# Patient Record
Sex: FEMALE | Race: WHITE | Employment: UNEMPLOYED | URBAN - METROPOLITAN AREA
[De-identification: names, ages, dates, MRNs, and addresses within clinical notes are randomized per-mention and may not be internally consistent; named-entity substitution may affect disease eponyms.]

---

## 2024-01-09 ENCOUNTER — OFFICE VISIT (OUTPATIENT)
Age: 44
End: 2024-01-09
Payer: MEDICAID

## 2024-01-09 VITALS
TEMPERATURE: 97.3 F | WEIGHT: 174.4 LBS | RESPIRATION RATE: 18 BRPM | HEART RATE: 82 BPM | BODY MASS INDEX: 29.06 KG/M2 | DIASTOLIC BLOOD PRESSURE: 101 MMHG | HEIGHT: 65 IN | SYSTOLIC BLOOD PRESSURE: 158 MMHG | OXYGEN SATURATION: 97 %

## 2024-01-09 DIAGNOSIS — Z12.31 ENCOUNTER FOR SCREENING MAMMOGRAM FOR MALIGNANT NEOPLASM OF BREAST: ICD-10-CM

## 2024-01-09 DIAGNOSIS — Z00.00 WELL ADULT EXAM: ICD-10-CM

## 2024-01-09 DIAGNOSIS — Z85.44 HX OF MALIGNANT NEOPLASM OF VULVA: ICD-10-CM

## 2024-01-09 DIAGNOSIS — Z72.0 TOBACCO USE: ICD-10-CM

## 2024-01-09 DIAGNOSIS — Z76.89 ENCOUNTER TO ESTABLISH CARE: Primary | ICD-10-CM

## 2024-01-09 DIAGNOSIS — F41.9 ANXIETY: ICD-10-CM

## 2024-01-09 DIAGNOSIS — Z59.86 FINANCIAL INSECURITY: ICD-10-CM

## 2024-01-09 DIAGNOSIS — I10 PRIMARY HYPERTENSION: ICD-10-CM

## 2024-01-09 PROCEDURE — 99204 OFFICE O/P NEW MOD 45 MIN: CPT | Performed by: STUDENT IN AN ORGANIZED HEALTH CARE EDUCATION/TRAINING PROGRAM

## 2024-01-09 PROCEDURE — 3077F SYST BP >= 140 MM HG: CPT | Performed by: STUDENT IN AN ORGANIZED HEALTH CARE EDUCATION/TRAINING PROGRAM

## 2024-01-09 PROCEDURE — 99386 PREV VISIT NEW AGE 40-64: CPT | Performed by: STUDENT IN AN ORGANIZED HEALTH CARE EDUCATION/TRAINING PROGRAM

## 2024-01-09 PROCEDURE — 3080F DIAST BP >= 90 MM HG: CPT | Performed by: STUDENT IN AN ORGANIZED HEALTH CARE EDUCATION/TRAINING PROGRAM

## 2024-01-09 RX ORDER — FLUOXETINE HYDROCHLORIDE 20 MG/1
40 CAPSULE ORAL DAILY
Qty: 60 CAPSULE | Refills: 2 | Status: SHIPPED | OUTPATIENT
Start: 2024-01-09

## 2024-01-09 RX ORDER — AMLODIPINE BESYLATE 5 MG/1
5 TABLET ORAL DAILY
Qty: 90 TABLET | Refills: 1 | Status: SHIPPED | OUTPATIENT
Start: 2024-01-09

## 2024-01-09 SDOH — ECONOMIC STABILITY: FOOD INSECURITY: WITHIN THE PAST 12 MONTHS, THE FOOD YOU BOUGHT JUST DIDN'T LAST AND YOU DIDN'T HAVE MONEY TO GET MORE.: SOMETIMES TRUE

## 2024-01-09 SDOH — ECONOMIC STABILITY: HOUSING INSECURITY
IN THE LAST 12 MONTHS, WAS THERE A TIME WHEN YOU DID NOT HAVE A STEADY PLACE TO SLEEP OR SLEPT IN A SHELTER (INCLUDING NOW)?: NO

## 2024-01-09 SDOH — ECONOMIC STABILITY: INCOME INSECURITY: HOW HARD IS IT FOR YOU TO PAY FOR THE VERY BASICS LIKE FOOD, HOUSING, MEDICAL CARE, AND HEATING?: SOMEWHAT HARD

## 2024-01-09 SDOH — ECONOMIC STABILITY - INCOME SECURITY: FINANCIAL INSECURITY: Z59.86

## 2024-01-09 SDOH — ECONOMIC STABILITY: FOOD INSECURITY: WITHIN THE PAST 12 MONTHS, YOU WORRIED THAT YOUR FOOD WOULD RUN OUT BEFORE YOU GOT MONEY TO BUY MORE.: SOMETIMES TRUE

## 2024-01-09 ASSESSMENT — PATIENT HEALTH QUESTIONNAIRE - PHQ9
SUM OF ALL RESPONSES TO PHQ QUESTIONS 1-9: 0
SUM OF ALL RESPONSES TO PHQ9 QUESTIONS 1 & 2: 0
SUM OF ALL RESPONSES TO PHQ QUESTIONS 1-9: 0
2. FEELING DOWN, DEPRESSED OR HOPELESS: 0
1. LITTLE INTEREST OR PLEASURE IN DOING THINGS: 0
SUM OF ALL RESPONSES TO PHQ QUESTIONS 1-9: 0
SUM OF ALL RESPONSES TO PHQ QUESTIONS 1-9: 0

## 2024-01-09 NOTE — PROGRESS NOTES
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  Hospitalized since your last visit?\"    NO    “Have you seen or consulted any other health care providers outside of [facility] since your last visit?”    NO        “Have you had a pap smear?”    NO           Vitals:    24 1506   BP: (!) 158/101   Pulse:    Resp:    Temp:    SpO2:         Health Maintenance Due   Topic Date Due    Hepatitis B vaccine (1 of 3 - 3-dose series) Never done    COVID-19 Vaccine (1) Never done    Varicella vaccine (1 of 2 - 2-dose childhood series) Never done    Depression Screen  Never done    HIV screen  Never done    Hepatitis C screen  Never done    DTaP/Tdap/Td vaccine (1 - Tdap) Never done    Cervical cancer screen  Never done    Lipids  Never done    Flu vaccine (1) Never done        The patient, Leeann Jimenez, identity was verified by name and .

## 2024-01-09 NOTE — PROGRESS NOTES
[facility]  [address] 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 female presenting to the clinic today to establish care.        Gyn  About 10 years ago, had a cancerous mass removal of the labia.   Last pap smear was about 2 years, and no previous mammogram  BP  Had tension headache last night. Hasn't had a BP check in a long time..  No history of being on antihypertensives in the past.  Has a strong family history of hypertension      Diet: Breakfast (skip or cereal), Lunch (fast food), Dinner (Navy beans and salted ham), Snacks (granola bars, cheese, fruits), Drinks (Monster, sprite, water and tea)  Sleep: 10pm-7pm, not tired in the morning.  Has been fatigued lately due to having viral gastroenteritis last week.  No longer having the GI symptoms but still feels wiped out  Pap? 2 years ago, hx of vulvar cancer  LMP: first 2 days has heavy periods and then they improve. Sexually active? Yes with 1 male partner, Contraception? Tubal ligation, Vaginal Complaints? None  Mammogram? none  Colonoscopy? N/a  Depression Screen:  PHQ-9 Total Score: 0 (1/9/2024  2:53 PM)              Review of systems:   A comprehensive review of systems was negative except as written in the HPI.    History    No Known Allergies    Past Medical History:   Diagnosis Date    Vulvar cancer (HCC)        Past Surgical History:   Procedure Laterality Date    VULVA SURGERY      mass removal        Family History   Problem Relation Age of Onset    Melanoma Mother     Hypertension Mother     Cirrhosis Father     Diabetes Maternal Grandmother     Hypersomnolence Maternal Grandmother         Social History     Socioeconomic

## 2024-01-09 NOTE — PATIENT INSTRUCTIONS
For the blood pressure: Take half a tablet of the amlodipine for 1 week, and then increase to 1 tablet daily after that    For the anxiety: For the first 2 weeks, take 1 capsule of prozac (fluoxetine in the morning, after that increase 2 capsules daily)

## 2024-01-16 ENCOUNTER — HOSPITAL ENCOUNTER (OUTPATIENT)
Facility: HOSPITAL | Age: 44
Discharge: HOME | End: 2024-01-19
Attending: STUDENT IN AN ORGANIZED HEALTH CARE EDUCATION/TRAINING PROGRAM
Payer: MEDICAID

## 2024-01-16 VITALS — WEIGHT: 174 LBS | BODY MASS INDEX: 28.99 KG/M2 | HEIGHT: 65 IN

## 2024-01-16 DIAGNOSIS — Z12.31 ENCOUNTER FOR SCREENING MAMMOGRAM FOR MALIGNANT NEOPLASM OF BREAST: ICD-10-CM

## 2024-01-16 PROCEDURE — 77067 SCR MAMMO BI INCL CAD: CPT

## 2024-02-20 ENCOUNTER — OFFICE VISIT (OUTPATIENT)
Age: 44
End: 2024-02-20
Payer: MEDICAID

## 2024-02-20 VITALS
DIASTOLIC BLOOD PRESSURE: 82 MMHG | BODY MASS INDEX: 29.42 KG/M2 | HEIGHT: 65 IN | SYSTOLIC BLOOD PRESSURE: 135 MMHG | HEART RATE: 71 BPM | WEIGHT: 176.6 LBS | RESPIRATION RATE: 18 BRPM | OXYGEN SATURATION: 99 % | TEMPERATURE: 97.5 F

## 2024-02-20 DIAGNOSIS — I10 PRIMARY HYPERTENSION: ICD-10-CM

## 2024-02-20 DIAGNOSIS — F41.9 ANXIETY: ICD-10-CM

## 2024-02-20 DIAGNOSIS — Z12.4 ENCOUNTER FOR PAPANICOLAOU SMEAR OF CERVIX: Primary | ICD-10-CM

## 2024-02-20 PROCEDURE — 99214 OFFICE O/P EST MOD 30 MIN: CPT | Performed by: STUDENT IN AN ORGANIZED HEALTH CARE EDUCATION/TRAINING PROGRAM

## 2024-02-20 PROCEDURE — 3075F SYST BP GE 130 - 139MM HG: CPT | Performed by: STUDENT IN AN ORGANIZED HEALTH CARE EDUCATION/TRAINING PROGRAM

## 2024-02-20 PROCEDURE — 3079F DIAST BP 80-89 MM HG: CPT | Performed by: STUDENT IN AN ORGANIZED HEALTH CARE EDUCATION/TRAINING PROGRAM

## 2024-02-20 ASSESSMENT — PATIENT HEALTH QUESTIONNAIRE - PHQ9
SUM OF ALL RESPONSES TO PHQ QUESTIONS 1-9: 0
SUM OF ALL RESPONSES TO PHQ QUESTIONS 1-9: 0
SUM OF ALL RESPONSES TO PHQ9 QUESTIONS 1 & 2: 0
1. LITTLE INTEREST OR PLEASURE IN DOING THINGS: 0
SUM OF ALL RESPONSES TO PHQ QUESTIONS 1-9: 0
2. FEELING DOWN, DEPRESSED OR HOPELESS: 0
SUM OF ALL RESPONSES TO PHQ QUESTIONS 1-9: 0

## 2024-02-20 NOTE — PROGRESS NOTES
[facility]  [address] [place] [address] [phone]    Office Visit      Assessment and Plan     1. Encounter for Papanicolaou smear of cervix  Pap smear completed today and will be sent out.  No current masses seen on the vulva.  Recommend a yearly pelvic exam for surveillance  - PAP IG, CT-NG-TV, Aptima HPV and rfx 16/18,45 (199315); Future    2. Primary hypertension  Chronic, controlled. Continue current regimen as described in the HPI and reconciled medication list      3. Anxiety  Chronic, uncontrolled.  Discussed importance of stress reduction techniques.  Continue Prozac for now.  If symptoms do not improve further, would recommend decreasing Prozac dose and adding Wellbutrin versus switching to a different SSRI        Return in about 3 months (around 5/20/2024) for Follow-up, anxiety.         Discussed the expected course, resolution and complications of the diagnosis(es) in detail.  Medication risks, benefits, costs, interactions, and alternatives were discussed as indicated.  Patient to contact the office if their condition worsens, changes or fails to improve. Pt verbalized understanding with the diagnosis(es) and plan.           Pattie Ace,     02/20/24   5:06 PM        Subjective     CC:   Chief Complaint   Patient presents with    Gynecologic Exam       Pt  has a past medical history of Vulvar cancer (HCC).    HPI: Leeann Jimenez is a 44 y.o. female presenting to the clinic today for evaluation and/or follow-up of the following concerns:      Gyn  About 10 years ago, had a cancerous mass removal of the labia.   Last pap smear was about 2 years, and no previous mammogram  Patient's last menstrual period was 01/19/2024 (approximate).  No current vaginal complaints    Hypertension  /82 today  Meds: Amlodipine 5mg daily.  Doing well no side effects  Anxiety  Currently on 40 mg of Prozac.  Feels like it makes her will agree to

## 2024-02-20 NOTE — PROGRESS NOTES
Chief Complaint   Patient presents with    Gynecologic Exam        \"Have you been to the ER, urgent care clinic since your last visit?  Hospitalized since your last visit?\"    NO    “Have you seen or consulted any other health care providers outside of [facility] since your last visit?”    NO        “Have you had a pap smear?”    NO           Vitals:    24 1457   BP: 135/82   Pulse: 71   Resp: 18   Temp: 97.5 °F (36.4 °C)   SpO2: 99%        Health Maintenance Due   Topic Date Due    Hepatitis B vaccine (1 of 3 - 3-dose series) Never done    COVID-19 Vaccine (1) Never done    Varicella vaccine (1 of 2 - 2-dose childhood series) Never done    Pneumococcal 0-64 years Vaccine (1 - PCV) Never done    HIV screen  Never done    Hepatitis C screen  Never done    DTaP/Tdap/Td vaccine (1 - Tdap) Never done    Cervical cancer screen  Never done    Diabetes screen  Never done    Lipids  Never done    Flu vaccine (1) Never done        The patient, Leeann Jimenez, identity was verified by name and .

## 2024-03-02 LAB
C TRACH RRNA CVX QL NAA+PROBE: NEGATIVE
CYTOLOGIST CVX/VAG CYTO: ABNORMAL
CYTOLOGY CVX/VAG DOC CYTO: ABNORMAL
CYTOLOGY CVX/VAG DOC THIN PREP: ABNORMAL
DX ICD CODE: ABNORMAL
DX ICD CODE: ABNORMAL
HPV GENOTYPE REFLEX: ABNORMAL
HPV I/H RISK 4 DNA CVX QL PROBE+SIG AMP: POSITIVE
Lab: ABNORMAL
N GONORRHOEA RRNA CVX QL NAA+PROBE: NEGATIVE
OTHER STN SPEC: ABNORMAL
PATHOLOGIST CVX/VAG CYTO: ABNORMAL
STAT OF ADQ CVX/VAG CYTO-IMP: ABNORMAL
T VAGINALIS RRNA SPEC QL NAA+PROBE: NEGATIVE

## 2024-03-14 ENCOUNTER — TELEPHONE (OUTPATIENT)
Age: 44
End: 2024-03-14

## 2024-03-14 NOTE — TELEPHONE ENCOUNTER
Patient needs a referral  sent to [facility]. Patient having a biopsy on uterus on March 21 at 7:30 am Phone number is [phone]. Patient ca be reached at [phone]

## 2024-03-19 NOTE — TELEPHONE ENCOUNTER
Will place referral, and cancel referral placed on 03/04/2024    Pattie Ace DO  03/19/24  11:24 AM

## 2024-04-27 DIAGNOSIS — F41.9 ANXIETY: ICD-10-CM

## 2024-04-29 RX ORDER — FLUOXETINE HYDROCHLORIDE 20 MG/1
40 CAPSULE ORAL DAILY
Qty: 60 CAPSULE | Refills: 2 | OUTPATIENT
Start: 2024-04-29

## 2024-04-29 RX ORDER — FLUOXETINE HYDROCHLORIDE 20 MG/1
40 CAPSULE ORAL DAILY
Qty: 180 CAPSULE | Refills: 0 | Status: SHIPPED | OUTPATIENT
Start: 2024-04-29

## 2024-06-01 DIAGNOSIS — F41.9 ANXIETY: ICD-10-CM

## 2024-06-03 RX ORDER — FLUOXETINE HYDROCHLORIDE 20 MG/1
40 CAPSULE ORAL DAILY
Qty: 180 CAPSULE | Refills: 0 | OUTPATIENT
Start: 2024-06-03

## 2024-07-10 DIAGNOSIS — F41.9 ANXIETY: ICD-10-CM

## 2024-07-11 RX ORDER — FLUOXETINE HYDROCHLORIDE 20 MG/1
40 CAPSULE ORAL DAILY
Qty: 180 CAPSULE | Refills: 0 | Status: SHIPPED | OUTPATIENT
Start: 2024-07-11

## 2024-08-21 DIAGNOSIS — I10 PRIMARY HYPERTENSION: ICD-10-CM

## 2024-08-22 NOTE — TELEPHONE ENCOUNTER
Last appointment: 2/20/24  Next appointment: no show 5/21/24  Previous refill encounter(s): 1/9/24 #90 with 1 refill    Requested Prescriptions     Pending Prescriptions Disp Refills    amLODIPine (NORVASC) 5 MG tablet [Pharmacy Med Name: AMLODIPINE BESYLATE 5 MG TAB] 30 tablet 0     Sig: Take 1 tablet by mouth daily Patient needs an appointment for further refills         For Pharmacy Admin Tracking Only    Program: Medication Refill  CPA in place:    Recommendation Provided To:   Intervention Detail: New Rx: 1, reason: Patient Preference  Intervention Accepted By:   Gap Closed?:    Time Spent (min): 5

## 2024-08-23 RX ORDER — AMLODIPINE BESYLATE 5 MG/1
5 TABLET ORAL DAILY
Qty: 30 TABLET | Refills: 0 | OUTPATIENT
Start: 2024-08-23

## 2024-08-30 DIAGNOSIS — I10 PRIMARY HYPERTENSION: ICD-10-CM

## 2024-09-03 RX ORDER — AMLODIPINE BESYLATE 5 MG/1
5 TABLET ORAL DAILY
Qty: 30 TABLET | Refills: 1 | Status: SHIPPED | OUTPATIENT
Start: 2024-09-03

## 2024-09-03 NOTE — TELEPHONE ENCOUNTER
Last appointment: 2/20/24  Next appointment: no show 5/21/24  Previous refill encounter(s): 1/9/24 #90 with 1 refill    Requested Prescriptions     Pending Prescriptions Disp Refills    amLODIPine (NORVASC) 5 MG tablet 90 tablet 1     Sig: Take 1 tablet by mouth daily         For Pharmacy Admin Tracking Only    Program: Medication Refill  CPA in place:    Recommendation Provided To:   Intervention Detail: New Rx: 1, reason: Patient Preference  Intervention Accepted By:   Gap Closed?:    Time Spent (min): 5

## 2024-12-05 DIAGNOSIS — F41.9 ANXIETY: ICD-10-CM

## 2024-12-05 DIAGNOSIS — I10 PRIMARY HYPERTENSION: ICD-10-CM

## 2024-12-05 RX ORDER — AMLODIPINE BESYLATE 5 MG/1
5 TABLET ORAL DAILY
Qty: 30 TABLET | Refills: 0 | Status: SHIPPED | OUTPATIENT
Start: 2024-12-05

## 2024-12-05 NOTE — TELEPHONE ENCOUNTER
Last appointment: 2/20/24  Next appointment: no show 5/21/24  Previous refill encounter(s): 9/3/24 Norvasc #30 with 1 refill, 7/11/24 Prozac #180    Requested Prescriptions     Pending Prescriptions Disp Refills    amLODIPine (NORVASC) 5 MG tablet [Pharmacy Med Name: AMLODIPINE BESYLATE 5 MG TAB] 30 tablet 0     Sig: Take 1 tablet by mouth daily Patient needs an appointment for further refills    FLUoxetine (PROZAC) 20 MG capsule [Pharmacy Med Name: FLUOXETINE HCL 20 MG CAPSULE] 60 capsule 0     Sig: Take 2 capsules by mouth daily Patient needs an appointment for further refills         For Pharmacy Admin Tracking Only    Program: Medication Refill  CPA in place:    Recommendation Provided To:   Intervention Detail: New Rx: 2, reason: Patient Preference  Intervention Accepted By:   Gap Closed?:    Time Spent (min): 5

## 2024-12-31 DIAGNOSIS — F41.9 ANXIETY: ICD-10-CM

## 2025-01-06 DIAGNOSIS — I10 PRIMARY HYPERTENSION: ICD-10-CM

## 2025-01-06 RX ORDER — AMLODIPINE BESYLATE 5 MG/1
5 TABLET ORAL DAILY
Qty: 30 TABLET | Refills: 0 | OUTPATIENT
Start: 2025-01-06

## 2025-05-02 ENCOUNTER — OFFICE VISIT (OUTPATIENT)
Age: 45
End: 2025-05-02
Payer: MEDICAID

## 2025-05-02 VITALS
WEIGHT: 183.6 LBS | OXYGEN SATURATION: 97 % | HEIGHT: 65 IN | TEMPERATURE: 98.2 F | BODY MASS INDEX: 30.59 KG/M2 | SYSTOLIC BLOOD PRESSURE: 136 MMHG | DIASTOLIC BLOOD PRESSURE: 86 MMHG | RESPIRATION RATE: 20 BRPM | HEART RATE: 79 BPM

## 2025-05-02 DIAGNOSIS — F41.9 ANXIETY: Primary | ICD-10-CM

## 2025-05-02 DIAGNOSIS — I10 PRIMARY HYPERTENSION: ICD-10-CM

## 2025-05-02 DIAGNOSIS — Z80.8 FAMILY HISTORY OF MELANOMA: ICD-10-CM

## 2025-05-02 DIAGNOSIS — R20.2 FACIAL TINGLING: ICD-10-CM

## 2025-05-02 PROCEDURE — 99214 OFFICE O/P EST MOD 30 MIN: CPT | Performed by: STUDENT IN AN ORGANIZED HEALTH CARE EDUCATION/TRAINING PROGRAM

## 2025-05-02 PROCEDURE — 3075F SYST BP GE 130 - 139MM HG: CPT | Performed by: STUDENT IN AN ORGANIZED HEALTH CARE EDUCATION/TRAINING PROGRAM

## 2025-05-02 PROCEDURE — 96127 BRIEF EMOTIONAL/BEHAV ASSMT: CPT | Performed by: STUDENT IN AN ORGANIZED HEALTH CARE EDUCATION/TRAINING PROGRAM

## 2025-05-02 PROCEDURE — 3079F DIAST BP 80-89 MM HG: CPT | Performed by: STUDENT IN AN ORGANIZED HEALTH CARE EDUCATION/TRAINING PROGRAM

## 2025-05-02 PROCEDURE — PBSHW BEHAV ASSMT W/SCORE & DOCD/STAND INSTRUMENT: Performed by: STUDENT IN AN ORGANIZED HEALTH CARE EDUCATION/TRAINING PROGRAM

## 2025-05-02 RX ORDER — AMLODIPINE BESYLATE 5 MG/1
5 TABLET ORAL DAILY
Qty: 90 TABLET | Refills: 1 | Status: SHIPPED | OUTPATIENT
Start: 2025-05-02

## 2025-05-02 SDOH — ECONOMIC STABILITY: FOOD INSECURITY: WITHIN THE PAST 12 MONTHS, YOU WORRIED THAT YOUR FOOD WOULD RUN OUT BEFORE YOU GOT MONEY TO BUY MORE.: NEVER TRUE

## 2025-05-02 SDOH — ECONOMIC STABILITY: FOOD INSECURITY: WITHIN THE PAST 12 MONTHS, THE FOOD YOU BOUGHT JUST DIDN'T LAST AND YOU DIDN'T HAVE MONEY TO GET MORE.: NEVER TRUE

## 2025-05-02 ASSESSMENT — ANXIETY QUESTIONNAIRES
5. BEING SO RESTLESS THAT IT IS HARD TO SIT STILL: SEVERAL DAYS
IF YOU CHECKED OFF ANY PROBLEMS ON THIS QUESTIONNAIRE, HOW DIFFICULT HAVE THESE PROBLEMS MADE IT FOR YOU TO DO YOUR WORK, TAKE CARE OF THINGS AT HOME, OR GET ALONG WITH OTHER PEOPLE: SOMEWHAT DIFFICULT
GAD7 TOTAL SCORE: 6
1. FEELING NERVOUS, ANXIOUS, OR ON EDGE: SEVERAL DAYS
6. BECOMING EASILY ANNOYED OR IRRITABLE: MORE THAN HALF THE DAYS
7. FEELING AFRAID AS IF SOMETHING AWFUL MIGHT HAPPEN: NOT AT ALL
3. WORRYING TOO MUCH ABOUT DIFFERENT THINGS: SEVERAL DAYS
2. NOT BEING ABLE TO STOP OR CONTROL WORRYING: SEVERAL DAYS
4. TROUBLE RELAXING: NOT AT ALL

## 2025-05-02 ASSESSMENT — PATIENT HEALTH QUESTIONNAIRE - PHQ9
1. LITTLE INTEREST OR PLEASURE IN DOING THINGS: SEVERAL DAYS
SUM OF ALL RESPONSES TO PHQ QUESTIONS 1-9: 2
SUM OF ALL RESPONSES TO PHQ QUESTIONS 1-9: 2
2. FEELING DOWN, DEPRESSED OR HOPELESS: SEVERAL DAYS
SUM OF ALL RESPONSES TO PHQ QUESTIONS 1-9: 2
SUM OF ALL RESPONSES TO PHQ QUESTIONS 1-9: 2

## 2025-05-02 NOTE — PROGRESS NOTES
Chief Complaint   Patient presents with    Anxiety     Follow up       \"Have you been to the ER, urgent care clinic since your last visit?  Hospitalized since your last visit?\"    NO    “Have you seen or consulted any other health care providers outside of [facility] since your last visit?”    NO            Click Here for Release of Records Request     No results found for this visit on 25.   Vitals:    25 0900   BP: 136/86   Pulse: 79   Resp: 20   Temp: 98.2 °F (36.8 °C)   TempSrc: Temporal   SpO2: 97%   Weight: 83.3 kg (183 lb 9.6 oz)   Height: 1.651 m (5' 5\")      Health Maintenance Due   Topic Date Due    Varicella vaccine (1 of 2 - 13+ 2-dose series) Never done    HIV screen  Never done    Hepatitis C screen  Never done    Hepatitis B vaccine (1 of 3 - 19+ 3-dose series) Never done    DTaP/Tdap/Td vaccine (1 - Tdap) Never done    Pneumococcal 0-49 years Vaccine (1 of 2 - PCV) Never done    Lipids  Never done    COVID-19 Vaccine ( - -25 season) Never done    Depression Screen  2025        The patient, Leeann Jimenez, identity was verified by name and .

## 2025-05-02 NOTE — PROGRESS NOTES
[facility]  [address]  [place] [id number] [phone]    Office Visit      Assessment and Plan      Diagnosis Orders   1. Anxiety  BEHAV ASSMT W/SCORE & DOCD/STAND INSTRUMENT    FLUoxetine (PROZAC) 20 MG capsule      2. Primary hypertension  amLODIPine (NORVASC) 5 MG tablet      3. Family history of melanoma  [facility] - Shayla Cuevas DO, Dermatology, [place]      4. Facial tingling              Assessment & Plan  1.  Facial tingling  Differential includes trigeminal neuralgia:.  - /86, stable but requires monitoring.  - Tingling sensation may be related to elevated BP or abrupt discontinuation of Prozac.  - Patient will follow-up if this continues    2. Anxiety: Chronic, uncontrolled.     - Increase fluoxetine to 20 mg daily for 2 weeks, then to 40 mg daily.  - If ineffective, discontinue fluoxetine.    3. Derm Concerns  - Referral to dermatologist for comprehensive skin examination.  - Family history of melanoma; concern about potential skin cancer.    4.  Hypertension  Chronic, stable.  Continue amlodipine    . Health maintenance.  - Schedule repeat Pap smear.    Follow-up  - Appointment scheduled for 6 months, or earlier if necessary for pap        Discussed the expected course, resolution and complications of the diagnosis(es) in detail.  Medication risks, benefits, costs, interactions, and alternatives were discussed as indicated.  Patient to contact the office if their condition worsens, changes or fails to improve. Pt verbalized understanding with the diagnosis(es) and plan.           Pattie Ace DO    05/02/25   10:11 AM        Subjective     CC:   Chief Complaint   Patient presents with    Anxiety     Follow up       Pt  has a past medical history of Vulvar cancer (HCC).     Leeann Jimenez is a 45 y.o. female presenting to the clinic today for evaluation and/or follow-up of the following concerns:      History of Present

## 2025-05-31 DIAGNOSIS — F41.9 ANXIETY: ICD-10-CM

## 2025-06-02 RX ORDER — FLUOXETINE HYDROCHLORIDE 40 MG/1
40 CAPSULE ORAL DAILY
Qty: 90 CAPSULE | Refills: 1 | Status: SHIPPED | OUTPATIENT
Start: 2025-06-02

## 2025-06-02 NOTE — TELEPHONE ENCOUNTER
Last appointment: 5/2/25  Next appointment: none  Previous refill encounter(s): 5/2/25 #42    Requested Prescriptions     Pending Prescriptions Disp Refills    FLUoxetine (PROZAC) 20 MG capsule [Pharmacy Med Name: FLUOXETINE HCL 20 MG CAPSULE] 42 capsule 0     Sig: TAKE 1 CAPSULE BY MOUTH DAILY FOR 14 DAYS, THEN 2 CAPSULES DAILY FOR 14 DAYS.         For Pharmacy Admin Tracking Only    Program: Medication Refill  CPA in place:    Recommendation Provided To:   Intervention Detail: New Rx: 1, reason: Patient Preference  Intervention Accepted By:   Gap Closed?:    Time Spent (min): 5